# Patient Record
Sex: FEMALE | Race: WHITE | Employment: FULL TIME | URBAN - METROPOLITAN AREA
[De-identification: names, ages, dates, MRNs, and addresses within clinical notes are randomized per-mention and may not be internally consistent; named-entity substitution may affect disease eponyms.]

---

## 2017-07-10 ENCOUNTER — HOSPITAL ENCOUNTER (OUTPATIENT)
Dept: PHYSICAL THERAPY | Facility: HOSPITAL | Age: 52
Setting detail: THERAPIES SERIES
Discharge: HOME | End: 2017-07-10
Attending: FAMILY MEDICINE
Payer: COMMERCIAL

## 2017-07-10 DIAGNOSIS — H93.19 TINNITUS: ICD-10-CM

## 2017-07-10 PROCEDURE — 97161 PT EVAL LOW COMPLEX 20 MIN: CPT

## 2017-07-10 PROCEDURE — 97112 NEUROMUSCULAR REEDUCATION: CPT

## 2017-07-19 ENCOUNTER — HOSPITAL ENCOUNTER (OUTPATIENT)
Dept: PHYSICAL THERAPY | Facility: HOSPITAL | Age: 52
Setting detail: THERAPIES SERIES
Discharge: HOME | End: 2017-07-19
Attending: FAMILY MEDICINE
Payer: COMMERCIAL

## 2017-07-19 PROCEDURE — 97112 NEUROMUSCULAR REEDUCATION: CPT

## 2017-07-19 PROCEDURE — 97110 THERAPEUTIC EXERCISES: CPT

## 2017-07-24 NOTE — PROGRESS NOTES
Dx: Tinnitus         Authorized # of Visits:  HMO 8 visits         Next MD visit: none scheduled  Fall Risk: standard         Precautions: n/a             Subjective: Pt. Reports 3/10 ringing in her ears.  States she called her MD to find out about going of cervical paraspinal, suboccipital release, trigger point massage for tinnitus         UT stretch, jaw stretch                  Bold exercises are part of HEP    Charges: Jarred 2( 30 min) NREED ( 20 min)  MT(10) Total Timed Treatment: 60 min     Total Treatme

## 2017-07-26 ENCOUNTER — HOSPITAL ENCOUNTER (OUTPATIENT)
Dept: PHYSICAL THERAPY | Facility: HOSPITAL | Age: 52
Setting detail: THERAPIES SERIES
Discharge: HOME | End: 2017-07-26
Attending: FAMILY MEDICINE
Payer: COMMERCIAL

## 2017-07-26 PROCEDURE — 97140 MANUAL THERAPY 1/> REGIONS: CPT

## 2017-07-26 PROCEDURE — 97112 NEUROMUSCULAR REEDUCATION: CPT

## 2017-07-26 PROCEDURE — 97110 THERAPEUTIC EXERCISES: CPT

## 2017-08-02 ENCOUNTER — APPOINTMENT (OUTPATIENT)
Dept: PHYSICAL THERAPY | Facility: HOSPITAL | Age: 52
End: 2017-08-02
Attending: FAMILY MEDICINE
Payer: COMMERCIAL

## 2017-08-09 ENCOUNTER — HOSPITAL ENCOUNTER (OUTPATIENT)
Dept: PHYSICAL THERAPY | Facility: HOSPITAL | Age: 52
Setting detail: THERAPIES SERIES
Discharge: HOME | End: 2017-08-09
Attending: FAMILY MEDICINE
Payer: COMMERCIAL

## 2017-08-09 PROCEDURE — 97112 NEUROMUSCULAR REEDUCATION: CPT

## 2017-08-09 PROCEDURE — 97140 MANUAL THERAPY 1/> REGIONS: CPT

## 2017-08-09 PROCEDURE — 97110 THERAPEUTIC EXERCISES: CPT

## 2017-08-24 NOTE — PROGRESS NOTES
Dx: Tinnitus         Authorized # of Visits:  HMO 8 visits         Next MD visit: none scheduled  Fall Risk: standard         Precautions: n/a             Subjective: Pt. Reports 2.5/10 ringing in her ears.  She has been off that for a week and has noticed and target  R/L and Up/down x 5 EO and x 5 EC        Motion sensitivity test (unable to provoke any change in symptoms)         MT cervical paraspinal, suboccipital release, trigger point massage for tinnitus MT cervical paraspinal, suboccipital release, t

## 2017-08-24 NOTE — PROGRESS NOTES
Dx: Tinnitus         Authorized # of Visits:  HMO 8 visits         Next MD visit: none scheduled  Fall Risk: standard         Precautions: n/a             Subjective: Pt. Reports 2/10 ringing in her ears.  States she has been able to listen to her radio wit Up/down x 5 EO and x 5 EC sensormotor training with laser beam and target  R/L and Up/down x 5 EO and x 5 EC       Motion sensitivity test (unable to provoke any change in symptoms)  bosu ball  With visual focus on X  *step ups x 10  *fwd lunge x 10  *lat

## 2017-09-07 ENCOUNTER — HOSPITAL ENCOUNTER (OUTPATIENT)
Dept: PHYSICAL THERAPY | Facility: HOSPITAL | Age: 52
Setting detail: THERAPIES SERIES
Discharge: HOME | End: 2017-09-07
Attending: FAMILY MEDICINE
Payer: COMMERCIAL

## 2017-09-07 PROCEDURE — 97110 THERAPEUTIC EXERCISES: CPT

## 2017-09-07 PROCEDURE — 97112 NEUROMUSCULAR REEDUCATION: CPT

## 2017-09-07 PROCEDURE — 97140 MANUAL THERAPY 1/> REGIONS: CPT

## 2017-09-12 NOTE — PROGRESS NOTES
Dx: Tinnitus         Authorized # of Visits:  HMO 8 visits         Next MD visit: none scheduled  Fall Risk: standard         Precautions: n/a             Subjective: Pt. Reports 1-2/10 dull buzz in her ears.  States she has been able to listen to her radio and backward and side stepping 10 feet x 4 each      Biodex  CTSIB biodex  *maze control level 12  *limits of stability level 12 biodex  *maze control level 11  *limits of stability level 11 biodex  *maze control level 11  *limits of stability level 11

## 2017-09-20 ENCOUNTER — HOSPITAL ENCOUNTER (OUTPATIENT)
Dept: PHYSICAL THERAPY | Facility: HOSPITAL | Age: 52
Setting detail: THERAPIES SERIES
Discharge: HOME | End: 2017-09-20
Attending: FAMILY MEDICINE
Payer: COMMERCIAL

## 2017-09-20 PROCEDURE — 97110 THERAPEUTIC EXERCISES: CPT

## 2017-09-20 PROCEDURE — 97112 NEUROMUSCULAR REEDUCATION: CPT

## 2017-09-20 NOTE — PROGRESS NOTES
Dear Dr. Fransico Garduno MD,    This letter is to inform you of Hector Gloria in Physical Therapy at [facility].     DX: Tinnitus        TREATMENT #  6   Of    6    DATE OF SERVICE: 9/ this plan of treatment and while under my care. X_________________________________________________ Date: ____________________    CERTIFICATION FROM: 6/22/0879 to 10/10/2017    Thank you for the referral of Leigh Purcell.  Please sign plan of care and r

## 2017-10-04 ENCOUNTER — APPOINTMENT (OUTPATIENT)
Dept: PHYSICAL THERAPY | Facility: HOSPITAL | Age: 52
End: 2017-10-04
Payer: COMMERCIAL

## 2018-01-05 ENCOUNTER — HOSPITAL ENCOUNTER (OUTPATIENT)
Dept: BONE DENSITY | Age: 53
Discharge: HOME | End: 2018-01-05
Attending: FAMILY MEDICINE
Payer: COMMERCIAL

## 2018-01-05 DIAGNOSIS — M85.80 OSTEOPENIA: ICD-10-CM

## 2018-01-05 PROCEDURE — 77080 DXA BONE DENSITY AXIAL: CPT | Performed by: FAMILY MEDICINE

## 2018-01-06 ENCOUNTER — HOSPITAL ENCOUNTER (OUTPATIENT)
Dept: MAMMOGRAPHY | Age: 53
Discharge: HOME | End: 2018-01-06
Attending: FAMILY MEDICINE
Payer: COMMERCIAL

## 2018-01-06 ENCOUNTER — HOSPITAL ENCOUNTER (OUTPATIENT)
Dept: CT IMAGING | Age: 53
Discharge: HOME | End: 2018-01-06
Attending: FAMILY MEDICINE
Payer: COMMERCIAL

## 2018-01-06 DIAGNOSIS — Z12.39 ENCOUNTER FOR SCREENING FOR MALIGNANT NEOPLASM OF BREAST: ICD-10-CM

## 2018-01-06 DIAGNOSIS — H93.19 TINNITUS: ICD-10-CM

## 2018-01-06 PROCEDURE — 77067 SCR MAMMO BI INCL CAD: CPT | Performed by: FAMILY MEDICINE

## 2018-01-06 PROCEDURE — 70470 CT HEAD/BRAIN W/O & W/DYE: CPT | Performed by: FAMILY MEDICINE

## 2018-07-24 ENCOUNTER — HOSPITAL (OUTPATIENT)
Dept: OTHER | Age: 53
End: 2018-07-24
Attending: FAMILY MEDICINE

## 2018-12-20 ENCOUNTER — OFFICE VISIT (OUTPATIENT)
Dept: INTERNAL MEDICINE CLINIC | Facility: CLINIC | Age: 53
End: 2018-12-20

## 2018-12-20 ENCOUNTER — TELEPHONE (OUTPATIENT)
Dept: INTERNAL MEDICINE CLINIC | Facility: CLINIC | Age: 53
End: 2018-12-20

## 2018-12-20 VITALS
WEIGHT: 181 LBS | HEIGHT: 67 IN | HEART RATE: 70 BPM | DIASTOLIC BLOOD PRESSURE: 70 MMHG | SYSTOLIC BLOOD PRESSURE: 110 MMHG | BODY MASS INDEX: 28.41 KG/M2

## 2018-12-20 DIAGNOSIS — R94.31 ABNORMAL ECG: ICD-10-CM

## 2018-12-20 DIAGNOSIS — E66.9 NON MORBID OBESITY, UNSPECIFIED OBESITY TYPE: ICD-10-CM

## 2018-12-20 DIAGNOSIS — E78.5 HYPERLIPIDEMIA, UNSPECIFIED HYPERLIPIDEMIA TYPE: ICD-10-CM

## 2018-12-20 DIAGNOSIS — Z51.81 ENCOUNTER FOR THERAPEUTIC DRUG MONITORING: Primary | ICD-10-CM

## 2018-12-20 DIAGNOSIS — Z82.49 FAMILY HISTORY OF CARDIOVASCULAR DISEASE: ICD-10-CM

## 2018-12-20 PROCEDURE — 99204 OFFICE O/P NEW MOD 45 MIN: CPT | Performed by: INTERNAL MEDICINE

## 2018-12-20 PROCEDURE — 93000 ELECTROCARDIOGRAM COMPLETE: CPT | Performed by: INTERNAL MEDICINE

## 2018-12-20 RX ORDER — ATORVASTATIN CALCIUM 20 MG/1
1 TABLET, FILM COATED ORAL DAILY
Refills: 2 | COMMUNITY
Start: 2018-11-29 | End: 2019-02-20

## 2018-12-20 RX ORDER — TOCOPHERSOLAN (VITAMIN E TPGS) 400/15ML
LIQUID (ML) ORAL
COMMUNITY

## 2018-12-20 NOTE — PATIENT INSTRUCTIONS
Plan:  Continue with medications:   Go to the lab for blood work   Follow up with me in 1 month  Schedule follow up appointments: Abby Vasquez (dietitian)      Please try to work on the following dietary changes:  1.  Drink lots of water and cut down on soda

## 2018-12-20 NOTE — PROGRESS NOTES
HISTORY OF PRESENT ILLNESS  Patient presents with:  Weight Problem: patient found us online and referred by Dr Pk Schultz is a 46year old female new to our office today for initiation of medical weight loss program.  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    Migraines: negative , + headaches , sees chiro once per month   Seizures: negative   Joint-rela PLT, MPV  No results found for: GLU, BUN, BUNCREA, CREATSERUM, ANIONGAP, GFR, GFRNAA, GFRAA, CA, OSMOCALC, ALKPHO, AST, ALT, ALKPHOS, BILT, TP, ALB, GLOBULT, GLOBULIN, AGRATIO, ALBGLOBRAT, NA, K, CL, CO2  No results found for: EAG, A1C  No results found fo Future  -     CARD ECHO STRESS ECHO/REST AND STRESS(CPT=93350/49124 DM 95644);  Future  -     OP REFERRAL TO [facility] ([facility] USE ONLY)    Family history of cardiovascular disease  -     CARD ECHO STRESS ECHO/REST AND STRESS[place]CPT=93350/78243 [facility] [place] [id number] [place]  Schedule follow up appointments: Facundo Orta (dietitian)      Please try to work on the following dietary changes:  1. Drink lots of water and cut down on soda/juice consumption if soda/juice drinker  2. Eat breakfast daily (within 1 hour)  3.

## 2018-12-20 NOTE — TELEPHONE ENCOUNTER
William Key called - returning a call for Stephane daley  From Dr Chelly Ulrich office     William Key states Dr Chelly Ulrich said it was ok for patient to take rx belviq

## 2018-12-20 NOTE — TELEPHONE ENCOUNTER
Left vm asking if they ok pt to take Belviq with her current medications.     Dr Jonathan Andujar [address]   Ph # [phone]  Fx# [phone]

## 2018-12-24 ENCOUNTER — TELEPHONE (OUTPATIENT)
Dept: INTERNAL MEDICINE CLINIC | Facility: CLINIC | Age: 53
End: 2018-12-24

## 2018-12-24 DIAGNOSIS — E66.9 OBESITY WITHOUT SERIOUS COMORBIDITY, UNSPECIFIED CLASSIFICATION, UNSPECIFIED OBESITY TYPE: Primary | ICD-10-CM

## 2018-12-24 NOTE — TELEPHONE ENCOUNTER
Pt just wanting to know status of order for lab work wasn't sure if lab work from [facility] was looked at yet and if shes going to need an order to do lab work

## 2018-12-24 NOTE — TELEPHONE ENCOUNTER
I spoke with patient and advised that there are no labs in her chart as of yet. She said you were requesting the labs from [facility].  She was just seen on Monday. She wants to make sure she completes any labs that you feel she needs.   I advised we appreciat

## 2018-12-26 NOTE — TELEPHONE ENCOUNTER
No results still for MD.  I called [facility] to obtain - they will fax now. Patient informed that Dr. Suzy Roman spoke with her psychiatrist and she may begin Belviq.   She should start the medication at night to get through side effects and may begin in am in about

## 2018-12-28 ENCOUNTER — LAB ENCOUNTER (OUTPATIENT)
Dept: LAB | Age: 53
End: 2018-12-28
Attending: INTERNAL MEDICINE
Payer: COMMERCIAL

## 2018-12-28 DIAGNOSIS — E66.9 OBESITY WITHOUT SERIOUS COMORBIDITY, UNSPECIFIED CLASSIFICATION, UNSPECIFIED OBESITY TYPE: ICD-10-CM

## 2018-12-28 PROCEDURE — 83036 HEMOGLOBIN GLYCOSYLATED A1C: CPT

## 2018-12-28 PROCEDURE — 84443 ASSAY THYROID STIM HORMONE: CPT

## 2018-12-28 PROCEDURE — 84439 ASSAY OF FREE THYROXINE: CPT

## 2018-12-28 PROCEDURE — 80053 COMPREHEN METABOLIC PANEL: CPT

## 2018-12-28 PROCEDURE — 36415 COLL VENOUS BLD VENIPUNCTURE: CPT

## 2018-12-28 PROCEDURE — 82607 VITAMIN B-12: CPT

## 2018-12-28 PROCEDURE — 85025 COMPLETE CBC W/AUTO DIFF WBC: CPT

## 2018-12-28 PROCEDURE — 82306 VITAMIN D 25 HYDROXY: CPT

## 2019-01-04 ENCOUNTER — HOSPITAL ENCOUNTER (OUTPATIENT)
Dept: CV DIAGNOSTICS | Facility: HOSPITAL | Age: 54
Discharge: HOME | End: 2019-01-04
Attending: INTERNAL MEDICINE
Payer: COMMERCIAL

## 2019-01-04 DIAGNOSIS — E66.9 NON MORBID OBESITY, UNSPECIFIED OBESITY TYPE: ICD-10-CM

## 2019-01-04 DIAGNOSIS — R94.31 ABNORMAL ECG: ICD-10-CM

## 2019-01-04 DIAGNOSIS — Z82.49 FAMILY HISTORY OF CARDIOVASCULAR DISEASE: ICD-10-CM

## 2019-01-04 DIAGNOSIS — E78.5 HYPERLIPIDEMIA, UNSPECIFIED HYPERLIPIDEMIA TYPE: ICD-10-CM

## 2019-01-04 PROCEDURE — 93350 STRESS TTE ONLY: CPT | Performed by: INTERNAL MEDICINE

## 2019-01-04 PROCEDURE — 93017 CV STRESS TEST TRACING ONLY: CPT | Performed by: INTERNAL MEDICINE

## 2019-01-04 PROCEDURE — 93018 CV STRESS TEST I&R ONLY: CPT | Performed by: INTERNAL MEDICINE

## 2019-01-21 ENCOUNTER — HOSPITAL ENCOUNTER (OUTPATIENT)
Dept: MAMMOGRAPHY | Age: 54
Discharge: HOME | End: 2019-01-21
Attending: FAMILY MEDICINE
Payer: COMMERCIAL

## 2019-01-21 DIAGNOSIS — Z12.39 BREAST CANCER SCREENING: ICD-10-CM

## 2019-01-21 PROCEDURE — 77067 SCR MAMMO BI INCL CAD: CPT | Performed by: FAMILY MEDICINE

## 2019-01-21 PROCEDURE — 77063 BREAST TOMOSYNTHESIS BI: CPT | Performed by: FAMILY MEDICINE

## 2019-02-19 NOTE — TELEPHONE ENCOUNTER
Spoke with patient and scheduled her for tomorrow    Future Appointments   Date Time Provider Ricardo Bonilla   2/20/2019  9:00 AM Chiqui King MD [facility] [address]

## 2019-02-20 ENCOUNTER — OFFICE VISIT (OUTPATIENT)
Dept: INTERNAL MEDICINE CLINIC | Facility: CLINIC | Age: 54
End: 2019-02-20

## 2019-02-20 VITALS
RESPIRATION RATE: 16 BRPM | WEIGHT: 184 LBS | DIASTOLIC BLOOD PRESSURE: 68 MMHG | HEART RATE: 78 BPM | BODY MASS INDEX: 28.88 KG/M2 | HEIGHT: 67 IN | SYSTOLIC BLOOD PRESSURE: 118 MMHG

## 2019-02-20 DIAGNOSIS — E66.9 OBESITY WITHOUT SERIOUS COMORBIDITY, UNSPECIFIED CLASSIFICATION, UNSPECIFIED OBESITY TYPE: ICD-10-CM

## 2019-02-20 DIAGNOSIS — Z51.81 ENCOUNTER FOR THERAPEUTIC DRUG MONITORING: Primary | ICD-10-CM

## 2019-02-20 PROCEDURE — 99213 OFFICE O/P EST LOW 20 MIN: CPT | Performed by: INTERNAL MEDICINE

## 2019-02-20 RX ORDER — PRAVASTATIN SODIUM 20 MG
TABLET ORAL
Refills: 0 | COMMUNITY
Start: 2019-02-08 | End: 2019-02-20

## 2019-02-20 RX ORDER — PRAVASTATIN SODIUM 40 MG
TABLET ORAL
Refills: 3 | COMMUNITY
Start: 2019-01-02

## 2019-02-20 RX ORDER — PEN NEEDLE, DIABETIC 30 GX3/16"
1 NEEDLE, DISPOSABLE MISCELLANEOUS DAILY
Qty: 90 EACH | Refills: 0 | Status: SHIPPED | OUTPATIENT
Start: 2019-02-20 | End: 2019-05-21

## 2019-02-20 NOTE — PROGRESS NOTES
HISTORY OF PRESENT ILLNESS  Patient presents with:  Weight Check: up 3 lb      Laurita Vazquez is a 46year old female here for follow up in medical weight loss program.     Denies chest pain, shortness of breath, dizziness, blurred vision, headache, pares 108 12/28/2018    CO2 26.0 12/28/2018     Lab Results   Component Value Date     12/28/2018    A1C 5.6 12/28/2018     No results found for: CHOLEST, TRIG, HDL, LDL, VLDL, TCHDLRATIO, NONHDLC, CHOLHDLRATIO, NONHDLC, CALCNONHDL  Lab Results   Componen minutes/ week in active weight loss)   · Weight Loss consent to treat reviewed and signed     There are no Patient Instructions on file for this visit. Return in about 4 weeks (around 3/20/2019) for weight management.     Patient verbalizes understanding

## 2019-02-20 NOTE — PROGRESS NOTES
Patient teaching on [address] [place] [address] performed. Pt injected 0.6 mg in the office today with a sample pen. Patient demonstrated back, all questions were answered and patient verbalized understanding.  RAFY

## 2019-12-13 ENCOUNTER — HOSPITAL (OUTPATIENT)
Dept: OTHER | Age: 54
End: 2019-12-13
Attending: FAMILY MEDICINE

## 2019-12-14 ENCOUNTER — HOSPITAL (OUTPATIENT)
Dept: OTHER | Age: 54
End: 2019-12-14
Attending: FAMILY MEDICINE

## 2020-01-16 ENCOUNTER — DIAGNOSTIC TRANS (OUTPATIENT)
Dept: OTHER | Age: 55
End: 2020-01-16

## 2020-01-16 ENCOUNTER — HOSPITAL (OUTPATIENT)
Dept: OTHER | Age: 55
End: 2020-01-16

## 2020-01-24 ENCOUNTER — HOSPITAL (OUTPATIENT)
Dept: OTHER | Age: 55
End: 2020-01-24
Attending: FAMILY MEDICINE

## 2021-05-14 NOTE — PATIENT INSTRUCTIONS
saxenda 0.6 x 2 weeks then 1.2 mg x 2 weeks.
continue zyrtec 5 mg and singulair 4 mg daily    Allergy labs as ordered, if allergen labs are normal will consider starting treatment for asthma. If abnormal will consider referral to allergist. Will follow up when results are available.    Adam voiced understanding